# Patient Record
Sex: MALE
[De-identification: names, ages, dates, MRNs, and addresses within clinical notes are randomized per-mention and may not be internally consistent; named-entity substitution may affect disease eponyms.]

---

## 2018-02-05 ENCOUNTER — HOSPITAL ENCOUNTER (EMERGENCY)
Dept: HOSPITAL 92 - ERS | Age: 28
Discharge: HOME | End: 2018-02-05
Payer: SELF-PAY

## 2018-02-05 DIAGNOSIS — F17.210: ICD-10-CM

## 2018-02-05 DIAGNOSIS — J06.9: Primary | ICD-10-CM

## 2018-02-05 PROCEDURE — 87804 INFLUENZA ASSAY W/OPTIC: CPT

## 2018-02-05 PROCEDURE — 99283 EMERGENCY DEPT VISIT LOW MDM: CPT

## 2019-01-29 ENCOUNTER — HOSPITAL ENCOUNTER (EMERGENCY)
Dept: HOSPITAL 62 - ER | Age: 29
Discharge: HOME | End: 2019-01-29
Payer: SELF-PAY

## 2019-01-29 VITALS — DIASTOLIC BLOOD PRESSURE: 85 MMHG | SYSTOLIC BLOOD PRESSURE: 129 MMHG

## 2019-01-29 DIAGNOSIS — F17.200: ICD-10-CM

## 2019-01-29 DIAGNOSIS — K08.9: Primary | ICD-10-CM

## 2019-01-29 PROCEDURE — 99282 EMERGENCY DEPT VISIT SF MDM: CPT

## 2019-01-29 PROCEDURE — 96372 THER/PROPH/DIAG INJ SC/IM: CPT

## 2019-01-29 NOTE — ER DOCUMENT REPORT
HPI





- HPI


Time Seen by Provider: 01/29/19 18:48


Pain Level: 5


Notes: 





Patient is a 28-year-old male who presents to the ED complaining of left lower 

dental pain #18 x2-3 days.  He has not noticed any obvious abscess or purulent 

discharge.  Patient states that he is still able to eat and drink, but does have

a decreased p.o. intake due to the pain.  He has tried some over-the-counter 

meds with minimal relief.  No other concerns or complaints.  Denies any 

headache, fever, head injury, neck pain, hoarseness, drooling, URI, sore throat,

chest pain, palpitations, syncope, cough, shortness of breath, wheeze, dyspnea, 

abdominal pain, nausea/vomiting/diarrhea, urinary retention, dysuria, hematuria,

or rash.





- ROS


Systems Reviewed and Negative: Yes All other systems reviewed and negative





Past Medical History





- Social History


Smoking Status: Current Every Day Smoker


Family History: Reviewed & Not Pertinent


Patient has suicidal ideation: No


Patient has homicidal ideation: No


Renal/ Medical History: Denies: Hx Peritoneal Dialysis





[facility] Provider Document





- CONSTITUTIONAL


Agree With Documented VS: Yes


Notes: 





PHYSICAL EXAMINATION:





GENERAL: Well-appearing, well-nourished and in no acute distress.





HEAD: Atraumatic, normocephalic.





EYES: Pupils equal round and reactive to light, extraocular movements intact, 

sclera anicteric, conjunctiva are normal.





ENT: EAC clear b/l.  TM's intact b/l without erythema, fluid, or perforation.  

Nares patent and without discharge.  oropharynx clear without exudates.  No 

tonsilar hypertrophy or erythema.  Moist mucous membranes.  No sinus tenderness.

 Uvula midline.  No palatine shift.  No tongue protrusion.  No respiratory 

compromise.





Mouth:  Poor dentition.  + severe decay and mild gingivitis.  No obvious abscess

or discharge noted.  No facial swelling.  + tenderness to tooth #18.





NECK: Normal range of motion, supple without lymphadenopathy.  No 

rigidity/meningismus.





LUNGS: Breath sounds clear to auscultation bilaterally and equal.  No wheezes 

rales or rhonchi.





HEART: Regular rate and rhythm without murmurs, rubs, gallops.





NEUROLOGICAL: Cranial nerves grossly intact.  Normal speech, normal gait.  

Normal sensory, motor exams 





PSYCH: Normal mood, normal affect.





SKIN: Warm, Dry, normal turgor, no rashes or lesions noted.





- INFECTION CONTROL


TRAVEL OUTSIDE OF THE [place] IN LAST 30 DAYS: No





Course





- Re-evaluation


Re-evalutation: 





01/29/19 18:58


Patient is an afebrile, well-hydrated, 28-year-old male who presents to the ED 

with dental pain, suspect nerve root etiology versus infection.  Vitals are 

acceptable.  PE is otherwise unremarkable.  No I&D, labs, or imaging warranted 

at this time based on H&P.  Viscous lidocaine dispensed today.  Toradol given 

IM.  I will send him home with a prescription for penicillin.  Low suspicion for

any meningitis, sepsis, peritonsillar/pharyngeal abscess, respiratory 

compromise, Broderick's, temporal arteritis, or other emergent systemic condition 

at this time.  Patient is aware this condition can change from initial 

presentation and he needs to monitor symptoms closely.  Conservative measures 

otherwise for symptoms.  Call to schedule an appointment with a dentist for 

further evaluation and management.  Recheck with your PCM this week as well.  

Return to the ED with any worsening/concerning symptoms otherwise as reviewed in

discharge.  Patient is in agreement.








- Vital Signs


Vital signs: 


                                        











Temp Pulse Resp BP Pulse Ox


 


 98.2 F   95   18   129/85 H  100 


 


 01/29/19 18:27  01/29/19 18:27  01/29/19 18:27  01/29/19 18:27  01/29/19 18:27














Discharge





- Discharge


Clinical Impression: 


 Pain, dental





Condition: Stable


Disposition: HOME, SELF-CARE


Instructions:  Toothache ([facility]), Penicillin V K ([facility]), Dentist


Additional Instructions: 


Brush and floss twice daily


Maintain fluid intake


Take antibiotics as directed


Mouthwash, salt water gargles, peroxide rinse as needed


Tylenol/ibuprofen as needed


Recheck with PCM this week


Call today/tomorrow and schedule an appointment with your dentist for further 

evaluation


Return to the ED with any worsening symptoms and/or development of fever, 

headache, facial swelling, swelling of lips/tongue/throat, trouble swallowing, 

drooling, hoarseness, neck pain/stiffness, chest pain, palpitations, syncope, 

shortness of breath, trouble breathing, abdominal pain, n/v/d, 

numbness/tingling, or other worsening symptoms that are concerning to you.


Prescriptions: 


Penicillin V Potassium [Penicillin Vk 250 mg Tablet] 500 mg PO BID #40 tablet


Forms:  Elevated Blood Pressure, Smoking Cessation Education


Referrals: 


[facility] [Provider Group] - Follow up as needed

## 2021-10-08 ENCOUNTER — HOSPITAL ENCOUNTER (EMERGENCY)
Facility: HOSPITAL | Age: 31
Discharge: HOME | End: 2021-10-08
Attending: EMERGENCY MEDICINE

## 2021-10-08 VITALS
RESPIRATION RATE: 18 BRPM | OXYGEN SATURATION: 100 % | TEMPERATURE: 97.9 F | DIASTOLIC BLOOD PRESSURE: 96 MMHG | HEART RATE: 86 BPM | SYSTOLIC BLOOD PRESSURE: 155 MMHG

## 2021-10-08 DIAGNOSIS — K04.7 DENTAL ABSCESS: Primary | ICD-10-CM

## 2021-10-08 PROCEDURE — 99284 EMERGENCY DEPT VISIT MOD MDM: CPT | Performed by: EMERGENCY MEDICINE

## 2021-10-08 PROCEDURE — 99282 EMERGENCY DEPT VISIT SF MDM: CPT

## 2021-10-08 RX ORDER — OXYCODONE HYDROCHLORIDE AND ACETAMINOPHEN 5; 325 MG/1; MG/1
1 TABLET ORAL EVERY 8 HOURS PRN
Qty: 6 TABLET | Refills: 0 | Status: SHIPPED | OUTPATIENT
Start: 2021-10-08 | End: 2021-10-14

## 2021-10-08 RX ORDER — AMOXICILLIN AND CLAVULANATE POTASSIUM 875; 125 MG/1; MG/1
1 TABLET, FILM COATED ORAL ONCE
Status: COMPLETED | OUTPATIENT
Start: 2021-10-08 | End: 2021-10-08

## 2021-10-08 RX ORDER — OXYCODONE HYDROCHLORIDE AND ACETAMINOPHEN 5; 325 MG/1; MG/1
1 TABLET ORAL ONCE
Status: COMPLETED | OUTPATIENT
Start: 2021-10-08 | End: 2021-10-08

## 2021-10-08 RX ORDER — AMOXICILLIN AND CLAVULANATE POTASSIUM 875; 125 MG/1; MG/1
1 TABLET, FILM COATED ORAL EVERY 12 HOURS
Qty: 20 TABLET | Refills: 0 | Status: SHIPPED | OUTPATIENT
Start: 2021-10-08 | End: 2021-10-18

## 2021-10-08 RX ADMIN — AMOXICILLIN AND CLAVULANATE POTASSIUM 1 TABLET: 875; 125 TABLET, FILM COATED ORAL at 21:21

## 2021-10-08 RX ADMIN — OXYCODONE HYDROCHLORIDE AND ACETAMINOPHEN 1 TABLET: 5; 325 TABLET ORAL at 21:21
